# Patient Record
Sex: FEMALE | Race: WHITE | URBAN - METROPOLITAN AREA
[De-identification: names, ages, dates, MRNs, and addresses within clinical notes are randomized per-mention and may not be internally consistent; named-entity substitution may affect disease eponyms.]

---

## 2017-02-07 PROCEDURE — 88175 CYTOPATH C/V AUTO FLUID REDO: CPT | Performed by: OBSTETRICS & GYNECOLOGY

## 2018-02-09 PROBLEM — F32.A DEPRESSIVE DISORDER: Status: ACTIVE | Noted: 2018-02-09

## 2018-06-18 PROCEDURE — 88175 CYTOPATH C/V AUTO FLUID REDO: CPT | Performed by: OBSTETRICS & GYNECOLOGY

## 2024-04-09 ENCOUNTER — OFFICE VISIT (OUTPATIENT)
Dept: FAMILY MEDICINE CLINIC | Facility: CLINIC | Age: 29
End: 2024-04-09
Payer: COMMERCIAL

## 2024-04-09 VITALS
TEMPERATURE: 98 F | SYSTOLIC BLOOD PRESSURE: 90 MMHG | HEART RATE: 76 BPM | WEIGHT: 140 LBS | DIASTOLIC BLOOD PRESSURE: 70 MMHG | RESPIRATION RATE: 16 BRPM | BODY MASS INDEX: 22.5 KG/M2 | HEIGHT: 66 IN | OXYGEN SATURATION: 98 %

## 2024-04-09 DIAGNOSIS — L28.2 PRURITIC RASH: Primary | ICD-10-CM

## 2024-04-09 DIAGNOSIS — L73.9 FOLLICULITIS: ICD-10-CM

## 2024-04-09 PROCEDURE — 99203 OFFICE O/P NEW LOW 30 MIN: CPT | Performed by: NURSE PRACTITIONER

## 2024-04-09 RX ORDER — TRIAMCINOLONE ACETONIDE 1 MG/G
CREAM TOPICAL 2 TIMES DAILY
Qty: 80 G | Refills: 0 | Status: SHIPPED | OUTPATIENT
Start: 2024-04-09 | End: 2024-04-19

## 2024-04-09 NOTE — PROGRESS NOTES
CHIEF COMPLAINT:     Chief Complaint   Patient presents with    Rash     Redness at L/R arms and inner upper thighs for 5-6 days.  Itchy on/off.  Pt does work-out at public gym.           HPI:    Taryn Lopez is a 28 year old female who presents for evaluation of a rash.  Per patient rash started in the past 5-6 days. First noted the rash on the right dorsal aspect of the hand, thought it may have been due to hand washing. Then noted rash on upper arms, then spread to the abdomen and inner right thigh. Rash is slightly pruritic. Reports small bumps and some pustules. Reports she recently started exercising at a gym, cannot think of any other new exposures. No new soaps, lotions, medications, detergents or foods. Treating symptoms with nothing otc.       Current Outpatient Medications   Medication Sig Dispense Refill    triamcinolone 0.1 % External Cream Apply topically 2 (two) times daily for 10 days. 80 g 0    mupirocin 2 % External Ointment Apply 1 Application topically 2 (two) times daily for 7 days. 30 g 0    Norgestimate-Eth Estradiol (ESTARYLLA) 0.25-35 MG-MCG Oral Tab Take 1 tablet by mouth daily. 1 Package 5    lamoTRIgine 200 MG Oral Tab Take 1 tablet (200 mg total) by mouth nightly. 30 tablet 1    escitalopram 10 MG Oral Tab Take 1 tab po qhs x 1 week then 1.5 tabs po qhs 45 tablet 1    TRAZODONE  MG Oral Tab TAKE 1/2 TO 1 TABLET(50  MG) BY MOUTH EVERY NIGHT 30 tablet 0    LamoTRIgine (LAMICTAL STARTER) 25 (42)-100 (7) MG Oral Kit Take 25 mg qhs x 2 weeks then 50 mg qhs x 2 weeks then 100 mg qhs x 1 week then 200 mg qhs 1 kit 0     No current facility-administered medications for this visit.      Past Medical History:   Diagnosis Date    Anxiety     Depression       Past Surgical History:   Procedure Laterality Date    BREAST LUMPECTOMY Bilateral     FOOT SURGERY      REMOVAL OF TONSILS,12+ Y/O      TONSILLECTOMY        Family History   Problem Relation Age of Onset    Diabetes Maternal  Grandfather       Social History     Socioeconomic History    Marital status: Single   Tobacco Use    Smoking status: Never    Smokeless tobacco: Never   Substance and Sexual Activity    Alcohol use: No     Alcohol/week: 0.0 standard drinks of alcohol     Comment: Occasional    Drug use: No         REVIEW OF SYSTEMS:   GENERAL: feels well otherwise, no fever, no chills.  SKIN: Per HPI. No edema. No ulcerations.  HEENT: Denies rhinorrhea, edema of the lips or swelling of throat.  CARDIOVASCULAR: Denies chest pains or palpitations.  LUNGS: Denies shortness of breath with exertion or rest. No cough or wheezing.  LYMPH: Denies enlargement of the lymph nodes.  NEURO: Denies abnormal sensation, tingling of the skin, or numbness.      EXAM:   BP 90/70   Pulse 76   Temp 98.1 °F (36.7 °C) (Oral)   Resp 16   Ht 5' 6\" (1.676 m)   Wt 140 lb (63.5 kg)   LMP 03/17/2024   SpO2 98%   BMI 22.60 kg/m²   GENERAL: well developed, well nourished,in no apparent distress  SKIN: right hand with small cluster of papules to dorsal aspect of hand near first metacarpal region. Few scattered papules and pustules to bilateral upper arms. Few scattered papules and pustules to upper back, chest, and right inner thigh.   EYES: PERRLA, EOMI, conjunctivae are clear  HENT: Head atraumatic, normocephalic. TM's WNL bilaterally. Normal external nose. Nasal mucosa pink without edema. No erythema of the throat. Oropharynx moist without lesions.  LUNGS: Clear to auscultation bilaterally.  No wheezing, rhonchi, or rales.  No diminished breath sounds. No increased work of breathing.   CARDIO: RRR without murmur  LYMPH: No cervical or clavicular lymphadenopathy.     ASSESSMENT AND PLAN:   Taryn Lopez is a 28 year old female who presents for evaluation of a rash. Findings are consistent with:    ASSESSMENT:  Encounter Diagnoses   Name Primary?    Pruritic rash Yes    Folliculitis        PLAN:   Rash on hand looks more consistent with contact  dermatitis.   Rash to upper arm and trunk possible folliculitis, papules and some pustules noted.   Meds as listed below.  Comfort measures as described in Patient Instructions.  Skin care discussed with patient.   Advised follow up with dermatology if not improving over the next few days  Advised urgent follow up for new or worsening symptoms.     Meds & Refills for this Visit:  Requested Prescriptions     Signed Prescriptions Disp Refills    triamcinolone 0.1 % External Cream 80 g 0     Sig: Apply topically 2 (two) times daily for 10 days.    mupirocin 2 % External Ointment 30 g 0     Sig: Apply 1 Application topically 2 (two) times daily for 7 days.         Risk and benefits of medication discussed.     Patient Instructions   Antibacterial body wash such as one containing benzoyl peroxide, this may be in the acne area.   Topical mupirocin antibiotic ointment as prescribed  Triamcinolone to area on hand  Follow up with dermatology if not improving over the next few days.    [facility]   [address] [place] [id number]  [phone]